# Patient Record
Sex: FEMALE | Race: WHITE
[De-identification: names, ages, dates, MRNs, and addresses within clinical notes are randomized per-mention and may not be internally consistent; named-entity substitution may affect disease eponyms.]

---

## 2020-01-14 ENCOUNTER — HOSPITAL ENCOUNTER (EMERGENCY)
Dept: HOSPITAL 26 - MED | Age: 30
Discharge: HOME | End: 2020-01-14
Payer: SELF-PAY

## 2020-01-14 VITALS — BODY MASS INDEX: 33.46 KG/M2 | WEIGHT: 196 LBS | HEIGHT: 64 IN

## 2020-01-14 VITALS — SYSTOLIC BLOOD PRESSURE: 108 MMHG | DIASTOLIC BLOOD PRESSURE: 61 MMHG

## 2020-01-14 VITALS — DIASTOLIC BLOOD PRESSURE: 58 MMHG | SYSTOLIC BLOOD PRESSURE: 105 MMHG

## 2020-01-14 DIAGNOSIS — Z98.84: ICD-10-CM

## 2020-01-14 DIAGNOSIS — J02.0: Primary | ICD-10-CM

## 2020-01-14 DIAGNOSIS — F17.210: ICD-10-CM

## 2020-01-14 PROCEDURE — 99283 EMERGENCY DEPT VISIT LOW MDM: CPT

## 2020-01-14 PROCEDURE — 87081 CULTURE SCREEN ONLY: CPT

## 2020-01-14 PROCEDURE — 96372 THER/PROPH/DIAG INJ SC/IM: CPT

## 2020-01-14 NOTE — NUR
SITTING UP IN CHAIR, ASSESSMENT COMPLETED. 

PATIENT REPORTING FEVER, HEADACHES, N/V, SORE THROAT. STATES SHE LAST TOOK 
ADVIL AT 1900 FOR FEVER. THROAT RED AND SWOLLEN WITH WHITE PUSS POCKETS SEEN ON 
TONSILS. NO NVD TODAY BUT NV X1 YESTERDAY. ABD SOFT AND NON-TENDER. LUNGS CLEAR 
NO RESP DISTRESS. AAO. NO PMH.

## 2020-01-14 NOTE — NUR
Patient discharged with v/s stable. Written and verbal after care instructions 
given and explained. 

Patient alert, oriented and verbalized understanding of instructions. 
Ambulatory with steady gait. All questions addressed prior to discharge. ID 
band removed. Patient advised to follow up with PMD. Rx of PREDNISONE, MOTRIN 
given. Patient educated on indication of medication including possible reaction 
and side effects. Opportunity to ask questions provided and answered.